# Patient Record
Sex: MALE | Race: BLACK OR AFRICAN AMERICAN | Employment: FULL TIME | URBAN - METROPOLITAN AREA
[De-identification: names, ages, dates, MRNs, and addresses within clinical notes are randomized per-mention and may not be internally consistent; named-entity substitution may affect disease eponyms.]

---

## 2020-09-21 ENCOUNTER — OFFICE VISIT (OUTPATIENT)
Dept: URGENT CARE | Age: 50
End: 2020-09-21
Payer: MEDICAID

## 2020-09-21 VITALS — OXYGEN SATURATION: 98 % | HEART RATE: 82 BPM | RESPIRATION RATE: 18 BRPM | TEMPERATURE: 98.5 F

## 2020-09-21 DIAGNOSIS — Z20.822 COVID-19 RULED OUT BY LABORATORY TESTING: Primary | ICD-10-CM

## 2020-09-21 PROCEDURE — 99212 OFFICE O/P EST SF 10 MIN: CPT | Performed by: FAMILY MEDICINE

## 2020-09-21 NOTE — PROGRESS NOTES
2020    Claudio Lee (:  1970) is a 54 y.o.  male, here requesting COVID-19 testing    History of Present Illness  Close contact with a lab confirmed COVID-19 patient within 14 days of symptom onset     Visit Vitals  Pulse 82   Temp 98.5 °F (36.9 °C)   Resp 18   SpO2 98%       ASSESSMENT  Screening for COVID-19/ Viral disease    PLAN  POCT influenza testing - Not Tested  COVID-19 sample collected and submitted. Patient given detailed CDC instructions contained within After Visit Summary. An  electronic signature was used to authenticate this note.     --Brie Conte MD

## 2020-09-23 LAB — SARS-COV-2, NAA: NOT DETECTED

## 2022-12-07 ENCOUNTER — HOSPITAL ENCOUNTER (EMERGENCY)
Age: 52
Discharge: HOME | End: 2022-12-07
Attending: STUDENT IN AN ORGANIZED HEALTH CARE EDUCATION/TRAINING PROGRAM
Payer: COMMERCIAL

## 2022-12-07 VITALS
OXYGEN SATURATION: 96 % | TEMPERATURE: 97.3 F | HEART RATE: 100 BPM | RESPIRATION RATE: 16 BRPM | SYSTOLIC BLOOD PRESSURE: 132 MMHG | DIASTOLIC BLOOD PRESSURE: 88 MMHG

## 2022-12-07 DIAGNOSIS — L02.416 ABSCESS OF LEFT THIGH: Primary | ICD-10-CM

## 2022-12-07 PROCEDURE — 99283 EMERGENCY DEPT VISIT LOW MDM: CPT

## 2022-12-07 RX ORDER — CEPHALEXIN 500 MG/1
500 CAPSULE ORAL 4 TIMES DAILY
Qty: 28 CAPSULE | Refills: 0 | Status: SHIPPED | OUTPATIENT
Start: 2022-12-07 | End: 2022-12-14

## 2022-12-07 RX ORDER — HYDROCODONE BITARTRATE AND ACETAMINOPHEN 5; 325 MG/1; MG/1
1 TABLET ORAL
Qty: 18 TABLET | Refills: 0 | Status: SHIPPED | OUTPATIENT
Start: 2022-12-07 | End: 2022-12-10

## 2022-12-07 RX ORDER — DOXYCYCLINE HYCLATE 100 MG
100 TABLET ORAL 2 TIMES DAILY
Qty: 14 TABLET | Refills: 0 | Status: SHIPPED | OUTPATIENT
Start: 2022-12-07 | End: 2022-12-14

## 2022-12-07 NOTE — ED TRIAGE NOTES
Pt c/o boil to the inside of his left upper thigh, +drainage, denies fever, stated it is about the size of a quarter

## 2022-12-07 NOTE — ED PROVIDER NOTES
Please note that this dictation was completed with [employer], the computer voice recognition software. Quite often unanticipated grammatical, syntax, homophones, and other interpretive errors are inadvertently transcribed by the computer software. Please disregard these errors. Please excuse any errors that have escaped final proofreading. Patient is a 51-year-old otherwise healthy male presenting to ED for evaluation of concern for abscess to his left thigh. He states he noticed this 3 to 4 days ago, has been applying warm compresses and has been able to express some pus and bloody discharge. He states the area is very painful especially when he walks. He denies fever or chills or any additional medical complaints at this time. Denies history of prior symptoms. Notes that he recently had a cortisone injection for his shoulder. No past medical history on file. No past surgical history on file. No family history on file. Social History     Socioeconomic History    Marital status:      Spouse name: Not on file    Number of children: Not on file    Years of education: Not on file    Highest education level: Not on file   Occupational History    Not on file   Tobacco Use    Smoking status: Never    Smokeless tobacco: Never   Substance and Sexual Activity    Alcohol use: Yes     Comment: socially    Drug use: Not on file    Sexual activity: Not on file   Other Topics Concern    Not on file   Social History Narrative    Not on file     Social Determinants of Health     Financial Resource Strain: Not on file   Food Insecurity: Not on file   Transportation Needs: Not on file   Physical Activity: Not on file   Stress: Not on file   Social Connections: Not on file   Intimate Partner Violence: Not on file   Housing Stability: Not on file         ALLERGIES: Patient has no known allergies. Review of Systems   Constitutional:  Negative for chills and fever.    HENT:  Negative for congestion, ear pain and sore throat. Eyes:  Negative for visual disturbance. Respiratory:  Negative for cough and shortness of breath. Cardiovascular:  Negative for chest pain. Gastrointestinal:  Negative for abdominal pain, diarrhea, nausea and vomiting. Genitourinary:  Negative for dysuria and flank pain. Musculoskeletal:  Negative for back pain. Skin:  Positive for color change and wound. Neurological:  Negative for dizziness and headaches. Psychiatric/Behavioral:  Negative for confusion. Vitals:    12/07/22 0848   BP: 132/88   Pulse: 100   Resp: 16   Temp: 97.3 °F (36.3 °C)   SpO2: 96%            Physical Exam  Vitals and nursing note reviewed. Constitutional:       General: He is not in acute distress. Appearance: Normal appearance. He is not ill-appearing. HENT:      Head: Normocephalic and atraumatic. Eyes:      General: Vision grossly intact. Extraocular Movements: Extraocular movements intact. Conjunctiva/sclera: Conjunctivae normal.   Neck:      Trachea: Phonation normal.   Cardiovascular:      Rate and Rhythm: Normal rate and regular rhythm. Heart sounds: Normal heart sounds. Pulmonary:      Effort: Pulmonary effort is normal.      Breath sounds: Normal breath sounds and air entry. Abdominal:      Palpations: Abdomen is soft. Tenderness: There is no abdominal tenderness. Musculoskeletal:         General: Normal range of motion. Left upper leg: Swelling (2-3 cm abscess to the left upper thigh, draining, with surrounding localized induration. Able to express purulent discharge with manual pressure.) and tenderness present. Legs:    Skin:     General: Skin is warm and dry. Neurological:      General: No focal deficit present. Mental Status: He is alert and oriented to person, place, and time.    Psychiatric:         Behavior: Behavior normal.        MDM  Number of Diagnoses or Management Options  Abscess of left thigh  Diagnosis management comments: Patient is alert, afebrile, vital stable. Presents ambulatory with abscess to his left thigh. Abscess is already actively draining, I was able to express further purulent discharge with direct pressure but he does not require further I&D here. Will recommend warm compresses, sitz bath's, and start him on antibiotics. Advised that he needs to continue to express the fluid and should follow-up with his PCP or general surgery if symptoms worsen or return. Return precautions outlined. All questions answered at this time. Amount and/or Complexity of Data Reviewed  Discuss the patient with other providers: yes (Discussed patient with ED attending Ambika Puckett MD who agrees with current management plan.     )         9:50 AM  Pt has been reevaluated. There are no new complaints, changes, or physical findings at this time. All results have been reviewed with patient and/or family. Medications have been reviewed w/ pt and/or family. Pt and/or family's questions have been answered. Pt and/or family expressed good understanding of the dx/tx/rx and is in agreement with plan of care. Pt instructed and agreed to f/u w/ surgery and to return to ED upon further deterioration. Return precautions outlined. All questions answered at this time. Pt is stable and ready for discharge. IMPRESSION:  1. Abscess of left thigh        PLAN:  1. Current Discharge Medication List        START taking these medications    Details   doxycycline (VIBRA-TABS) 100 mg tablet Take 1 Tablet by mouth two (2) times a day for 7 days. Qty: 14 Tablet, Refills: 0  Start date: 12/7/2022, End date: 12/14/2022      cephALEXin (Keflex) 500 mg capsule Take 1 Capsule by mouth four (4) times daily for 7 days. Qty: 28 Capsule, Refills: 0  Start date: 12/7/2022, End date: 12/14/2022      HYDROcodone-acetaminophen (Norco) 5-325 mg per tablet Take 1 Tablet by mouth every four (4) hours as needed for Pain for up to 3 days.  Max Daily Amount: 6 Tablets. Qty: 18 Tablet, Refills: 0  Start date: 12/7/2022, End date: 12/10/2022    Associated Diagnoses: Abscess of left thigh           2.    Follow-up Information       Follow up With Specialties Details Why Contact Info    Your Primary Care Provider  Schedule an appointment as soon as possible for a visit       Montana Quach MD General Surgery, Surgery General, Oncology Go to  As needed if abscess persists or returns [address] [id number] [phone]                Return to ED if worse     Procedures

## 2023-04-18 ENCOUNTER — HOSPITAL ENCOUNTER (EMERGENCY)
Age: 53
Discharge: HOME | End: 2023-04-18
Attending: EMERGENCY MEDICINE
Payer: COMMERCIAL

## 2023-04-18 ENCOUNTER — APPOINTMENT (OUTPATIENT)
Dept: CT IMAGING | Age: 53
End: 2023-04-18
Attending: EMERGENCY MEDICINE
Payer: COMMERCIAL

## 2023-04-18 VITALS
HEIGHT: 68 IN | OXYGEN SATURATION: 97 % | SYSTOLIC BLOOD PRESSURE: 130 MMHG | BODY MASS INDEX: 30.87 KG/M2 | HEART RATE: 62 BPM | WEIGHT: 203.71 LBS | TEMPERATURE: 98 F | RESPIRATION RATE: 13 BRPM | DIASTOLIC BLOOD PRESSURE: 90 MMHG

## 2023-04-18 DIAGNOSIS — R42 VERTIGO: Primary | ICD-10-CM

## 2023-04-18 DIAGNOSIS — R42 DIZZINESS: ICD-10-CM

## 2023-04-18 LAB
ALBUMIN SERPL-MCNC: 3.8 G/DL (ref 3.5–5)
ALBUMIN/GLOB SERPL: 1 (ref 1.1–2.2)
ALP SERPL-CCNC: 87 U/L (ref 45–117)
ALT SERPL-CCNC: 32 U/L (ref 12–78)
ANION GAP SERPL CALC-SCNC: 2 MMOL/L (ref 5–15)
AST SERPL-CCNC: 20 U/L (ref 15–37)
ATRIAL RATE: 68 BPM
BASOPHILS # BLD: 0 K/UL (ref 0–0.1)
BASOPHILS NFR BLD: 1 % (ref 0–1)
BILIRUB SERPL-MCNC: 0.7 MG/DL (ref 0.2–1)
BUN SERPL-MCNC: 15 MG/DL (ref 6–20)
BUN/CREAT SERPL: 13 (ref 12–20)
CALCIUM SERPL-MCNC: 8.7 MG/DL (ref 8.5–10.1)
CALCULATED P AXIS, ECG09: 64 DEGREES
CALCULATED R AXIS, ECG10: 67 DEGREES
CALCULATED T AXIS, ECG11: 39 DEGREES
CHLORIDE SERPL-SCNC: 107 MMOL/L (ref 97–108)
CO2 SERPL-SCNC: 29 MMOL/L (ref 21–32)
COMMENT, HOLDF: NORMAL
CREAT SERPL-MCNC: 1.17 MG/DL (ref 0.7–1.3)
DIAGNOSIS, 93000: NORMAL
DIFFERENTIAL METHOD BLD: ABNORMAL
EOSINOPHIL # BLD: 0.1 K/UL (ref 0–0.4)
EOSINOPHIL NFR BLD: 2 % (ref 0–7)
ERYTHROCYTE [DISTWIDTH] IN BLOOD BY AUTOMATED COUNT: 12.5 % (ref 11.5–14.5)
GLOBULIN SER CALC-MCNC: 4 G/DL (ref 2–4)
GLUCOSE BLD STRIP.AUTO-MCNC: 95 MG/DL (ref 65–117)
GLUCOSE SERPL-MCNC: 101 MG/DL (ref 65–100)
HCT VFR BLD AUTO: 44.5 % (ref 36.6–50.3)
HGB BLD-MCNC: 14.3 G/DL (ref 12.1–17)
IMM GRANULOCYTES # BLD AUTO: 0 K/UL (ref 0–0.04)
IMM GRANULOCYTES NFR BLD AUTO: 0 % (ref 0–0.5)
LYMPHOCYTES # BLD: 2.9 K/UL (ref 0.8–3.5)
LYMPHOCYTES NFR BLD: 64 % (ref 12–49)
MCH RBC QN AUTO: 27.9 PG (ref 26–34)
MCHC RBC AUTO-ENTMCNC: 32.1 G/DL (ref 30–36.5)
MCV RBC AUTO: 86.7 FL (ref 80–99)
MONOCYTES # BLD: 0.3 K/UL (ref 0–1)
MONOCYTES NFR BLD: 8 % (ref 5–13)
NEUTS SEG # BLD: 1.1 K/UL (ref 1.8–8)
NEUTS SEG NFR BLD: 25 % (ref 32–75)
NRBC # BLD: 0 K/UL (ref 0–0.01)
NRBC BLD-RTO: 0 PER 100 WBC
P-R INTERVAL, ECG05: 148 MS
PLATELET # BLD AUTO: 252 K/UL (ref 150–400)
PMV BLD AUTO: 9.3 FL (ref 8.9–12.9)
POTASSIUM SERPL-SCNC: 3.6 MMOL/L (ref 3.5–5.1)
PROT SERPL-MCNC: 7.8 G/DL (ref 6.4–8.2)
Q-T INTERVAL, ECG07: 420 MS
QRS DURATION, ECG06: 92 MS
QTC CALCULATION (BEZET), ECG08: 446 MS
RBC # BLD AUTO: 5.13 M/UL (ref 4.1–5.7)
SAMPLES BEING HELD,HOLD: NORMAL
SERVICE CMNT-IMP: NORMAL
SODIUM SERPL-SCNC: 138 MMOL/L (ref 136–145)
TROPONIN I SERPL HS-MCNC: 6 NG/L (ref 0–76)
VENTRICULAR RATE, ECG03: 68 BPM
WBC # BLD AUTO: 4.4 K/UL (ref 4.1–11.1)

## 2023-04-18 PROCEDURE — 74011250636 HC RX REV CODE- 250/636: Performed by: EMERGENCY MEDICINE

## 2023-04-18 PROCEDURE — 99284 EMERGENCY DEPT VISIT MOD MDM: CPT

## 2023-04-18 PROCEDURE — 93005 ELECTROCARDIOGRAM TRACING: CPT

## 2023-04-18 PROCEDURE — 85025 COMPLETE CBC W/AUTO DIFF WBC: CPT

## 2023-04-18 PROCEDURE — 36415 COLL VENOUS BLD VENIPUNCTURE: CPT

## 2023-04-18 PROCEDURE — 96374 THER/PROPH/DIAG INJ IV PUSH: CPT

## 2023-04-18 PROCEDURE — 80053 COMPREHEN METABOLIC PANEL: CPT

## 2023-04-18 PROCEDURE — 82962 GLUCOSE BLOOD TEST: CPT

## 2023-04-18 PROCEDURE — 70450 CT HEAD/BRAIN W/O DYE: CPT

## 2023-04-18 PROCEDURE — 84484 ASSAY OF TROPONIN QUANT: CPT

## 2023-04-18 PROCEDURE — 96361 HYDRATE IV INFUSION ADD-ON: CPT

## 2023-04-18 RX ORDER — ONDANSETRON 4 MG/1
4 TABLET, FILM COATED ORAL
Qty: 20 TABLET | Refills: 0 | Status: SHIPPED | OUTPATIENT
Start: 2023-04-18

## 2023-04-18 RX ORDER — MECLIZINE HYDROCHLORIDE 25 MG/1
25 TABLET ORAL
Qty: 20 TABLET | Refills: 0 | Status: SHIPPED | OUTPATIENT
Start: 2023-04-18

## 2023-04-18 RX ORDER — MECLIZINE HCL 12.5 MG 12.5 MG/1
25 TABLET ORAL
Status: COMPLETED | OUTPATIENT
Start: 2023-04-18 | End: 2023-04-18

## 2023-04-18 RX ORDER — DIAZEPAM 10 MG/2ML
2 INJECTION INTRAMUSCULAR
Status: COMPLETED | OUTPATIENT
Start: 2023-04-18 | End: 2023-04-18

## 2023-04-18 RX ADMIN — SODIUM CHLORIDE 1000 ML: 9 INJECTION, SOLUTION INTRAVENOUS at 05:40

## 2023-04-18 RX ADMIN — DIAZEPAM 2 MG: 5 INJECTION, SOLUTION INTRAMUSCULAR; INTRAVENOUS at 05:40

## 2023-04-18 RX ADMIN — MECLIZINE 25 MG: 12.5 TABLET ORAL at 05:40

## 2023-04-18 NOTE — ED NOTES
MD Rodolfo Solorzano reviewed discharge instructions with the patient. The patient verbalized understanding. All questions and concerns were addressed. The patient is discharged with instructions and prescriptions in hand. Pt is alert and oriented x 4. Respirations are clear and unlabored.

## 2023-04-18 NOTE — ED PROVIDER NOTES
[facility] EMERGENCY DEPT  EMERGENCY DEPARTMENT ENCOUNTER       Pt Name: Gayathri Faye  MRN: [id number]  [place] 1970  Date of evaluation: 4/18/2023  Provider: Ulices Pack MD   PCP: None  Note Started: 6:47 AM 4/18/23     CHIEF COMPLAINT       Chief Complaint   Patient presents with    Dizziness     Pt went to Gym this morning and on his first set, pt felt dizzy when he went from a lying to sitting position. Pt felt the room was spinning. Pt reports that at the gym he had an unsteady gait and reports feeling warm. HISTORY OF PRESENT ILLNESS: 1 or more elements      History From: Patient and Patient's Wife       Gayathri Faye is a 50 y.o. male with l history of cervical radiculopathy who presents to the ED with dizziness that started while he was working out at [employer] this morning. Patient reports he got up at about 3 AM at home to get ready and felt like his equilibrium was off when he was walking. He then drove to the gym and was working out doing bench presses. After his first set, he stood up off the bench and felt unsteady with walking. He then tells me he started \"to panic\". He laid down again and tried to get up and felt dizzy again. States the dizziness was room spinning and lightheaded feeling. He denies any chest pain, shortness of breath, palpitations, vision changes, numbness, tingling, weakness, nausea, vomiting, abdominal pain, dysuria, hematuria, urinary frequency, black or bloody stools. Wife reports that patient has been trying to lose weight recently and has cut the grass 3 times this week with a sauna suit on in order to lose weight. He also was wearing a sauna suit this morning while he was working out. REVIEW OF SYSTEMS      Review of Systems     Positives and Pertinent negatives as per HPI. PAST HISTORY     Past Medical History:  No past medical history on file. Past Surgical History:  No past surgical history on file.     Family History:  No family history on file. Social History:  Social History     Tobacco Use    Smoking status: Never    Smokeless tobacco: Never   Substance Use Topics    Alcohol use: Yes     Comment: socially       Allergies:  No Known Allergies    CURRENT MEDICATIONS      Previous Medications    No medications on file       PHYSICAL EXAM      ED Triage Vitals   ED Encounter Vitals Group      BP 04/18/23 0511 (!) 145/94      Pulse (Heart Rate) 04/18/23 0511 67      Resp Rate 04/18/23 0511 20      Temp 04/18/23 0511 98 °F (36.7 °C)      Temp src --       O2 Sat (%) 04/18/23 0511 98 %      Weight 04/18/23 0516 203 lb 11.3 oz      Height 04/18/23 0516 5' 8\"        Physical Exam  Vitals and nursing note reviewed. Constitutional:       General: He is not in acute distress. Appearance: Normal appearance. Eyes:      Extraocular Movements: Extraocular movements intact. Pupils: Pupils are equal, round, and reactive to light. Cardiovascular:      Rate and Rhythm: Normal rate and regular rhythm. Pulses: Normal pulses. Heart sounds: Normal heart sounds. No murmur heard. Pulmonary:      Effort: Pulmonary effort is normal.      Breath sounds: Normal breath sounds. No wheezing. Chest:      Chest wall: No tenderness. Abdominal:      Palpations: Abdomen is soft. Tenderness: There is no abdominal tenderness. Musculoskeletal:         General: No swelling or tenderness. Normal range of motion. Cervical back: Normal range of motion. No tenderness. Skin:     General: Skin is warm and dry. Capillary Refill: Capillary refill takes less than 2 seconds. Coloration: Skin is not jaundiced. Findings: No erythema. Neurological:      General: No focal deficit present. Mental Status: He is alert and oriented to person, place, and time. Motor: No weakness.       Comments: Negative test of skew, extraocular movements intact  Cranial nerves II through XII intact  No focal or motor deficit  Speech is normal Psychiatric:      Comments: Anxious          DIAGNOSTIC RESULTS   LABS:     Recent Results (from the past 12 hour(s))   EKG, 12 LEAD, INITIAL    Collection Time: 04/18/23  5:03 AM   Result Value Ref Range    Ventricular Rate 68 BPM    Atrial Rate 68 BPM    P-R Interval 148 ms    QRS Duration 92 ms    Q-T Interval 420 ms    QTC Calculation (Bezet) 446 ms    Calculated P Axis 64 degrees    Calculated R Axis 67 degrees    Calculated T Axis 39 degrees    Diagnosis       Normal sinus rhythm  Normal ECG  When compared with ECG of 31-OCT-2009 05:41,  No significant change was found     GLUCOSE, POC    Collection Time: 04/18/23  5:08 AM   Result Value Ref Range    Glucose (POC) 95 65 - 117 mg/dL    Performed by Bradford Rubio RN    CBC WITH AUTOMATED DIFF    Collection Time: 04/18/23  5:13 AM   Result Value Ref Range    WBC 4.4 4.1 - 11.1 K/uL    RBC 5.13 4.10 - 5.70 M/uL    HGB 14.3 12.1 - 17.0 g/dL    HCT 44.5 36.6 - 50.3 %    MCV 86.7 80.0 - 99.0 FL    MCH 27.9 26.0 - 34.0 PG    MCHC 32.1 30.0 - 36.5 g/dL    RDW 12.5 11.5 - 14.5 %    PLATELET 838 478 - 376 K/uL    MPV 9.3 8.9 - 12.9 FL    NRBC 0.0 0  WBC    ABSOLUTE NRBC 0.00 0.00 - 0.01 K/uL    NEUTROPHILS 25 (L) 32 - 75 %    LYMPHOCYTES 64 (H) 12 - 49 %    MONOCYTES 8 5 - 13 %    EOSINOPHILS 2 0 - 7 %    BASOPHILS 1 0 - 1 %    IMMATURE GRANULOCYTES 0 0.0 - 0.5 %    ABS. NEUTROPHILS 1.1 (L) 1.8 - 8.0 K/UL    ABS. LYMPHOCYTES 2.9 0.8 - 3.5 K/UL    ABS. MONOCYTES 0.3 0.0 - 1.0 K/UL    ABS. EOSINOPHILS 0.1 0.0 - 0.4 K/UL    ABS. BASOPHILS 0.0 0.0 - 0.1 K/UL    ABS. IMM.  GRANS. 0.0 0.00 - 0.04 K/UL    DF AUTOMATED     METABOLIC PANEL, COMPREHENSIVE    Collection Time: 04/18/23  5:13 AM   Result Value Ref Range    Sodium 138 136 - 145 mmol/L    Potassium 3.6 3.5 - 5.1 mmol/L    Chloride 107 97 - 108 mmol/L    CO2 29 21 - 32 mmol/L    Anion gap 2 (L) 5 - 15 mmol/L    Glucose 101 (H) 65 - 100 mg/dL    BUN 15 6 - 20 MG/DL    Creatinine 1.17 0.70 - 1.30 MG/DL BUN/Creatinine ratio 13 12 - 20      eGFR >60 >60 ml/min/1.73m2    Calcium 8.7 8.5 - 10.1 MG/DL    Bilirubin, total 0.7 0.2 - 1.0 MG/DL    ALT (SGPT) 32 12 - 78 U/L    AST (SGOT) 20 15 - 37 U/L    Alk. phosphatase 87 45 - 117 U/L    Protein, total 7.8 6.4 - 8.2 g/dL    Albumin 3.8 3.5 - 5.0 g/dL    Globulin 4.0 2.0 - 4.0 g/dL    A-G Ratio 1.0 (L) 1.1 - 2.2     TROPONIN-HIGH SENSITIVITY    Collection Time: 04/18/23  5:13 AM   Result Value Ref Range    Troponin-High Sensitivity 6 0 - 76 ng/L   SAMPLES BEING HELD    Collection Time: 04/18/23  5:13 AM   Result Value Ref Range    SAMPLES BEING HELD BLUE,SST     COMMENT        Add-on orders for these samples will be processed based on acceptable specimen integrity and analyte stability, which may vary by analyte. EKG at 5:03 AM on 4/18/2023 interpreted by me: Normal sinus rhythm, 68 bpm, normal axis, normal MD, QRS, QTc intervals, no ischemic changes     RADIOLOGY:       Interpretation per the Radiologist below, if available at the time of this note:     CT HEAD WO CONT    Result Date: 4/18/2023  EXAM: CT HEAD WO CONT INDICATION: dizziness COMPARISON: None. CONTRAST: None. TECHNIQUE: Unenhanced CT of the head was performed using 5 mm images. Brain and bone windows were generated. Coronal and sagittal reformats. CT dose reduction was achieved through use of a standardized protocol tailored for this examination and automatic exposure control for dose modulation. FINDINGS: The ventricles and sulci are normal in size, shape and configuration. There is no significant white matter disease. There is no intracranial hemorrhage, extra-axial collection, or mass effect. The basilar cisterns are open. No CT evidence of acute infarct. The bone windows demonstrate no abnormalities. There is near complete opacification of the left maxillary sinus. No acute intracranial process. Left maxillary sinus disease.                 EMERGENCY DEPARTMENT COURSE and DIFFERENTIAL DIAGNOSIS/MDM   Vitals:    Vitals:    04/18/23 0521 04/18/23 0523 04/18/23 0525 04/18/23 0555   BP: 127/86 (!) 128/92 (!) 128/92 (!) 130/90   Pulse: 73 77 67 62   Resp:    13   Temp:       SpO2: 96%  94% 97%   Weight:       Height:            Patient was given the following medications:  Medications   sodium chloride 0.9 % bolus infusion 1,000 mL (1,000 mL IntraVENous New Bag 4/18/23 0540)   meclizine (ANTIVERT) tablet 25 mg (25 mg Oral Given 4/18/23 0540)   diazePAM (VALIUM) injection 2 mg (2 mg IntraVENous Given 4/18/23 0540)               CC/HPI Summary, DDx, ED Course, and Reassessment:   Patient presents to the ED with dizziness while working out at the gym. Reports that he has been trying to lose weight and using a sauna suit while exercising and cutting the grass recently to help make him sweat more. On arrival, vital signs are reassuring. Patient does not have any focal neurologic deficits. Differential diagnosis includes benign positional vertigo, orthostatic hypotension, dehydration, electrolyte abnormality. Low suspicion for central process given negative test of skew. Labs and imaging obtained including CBC, CMP, troponin, head CT. We will treat with meclizine, Valium, IV fluids    Lab and imaging reviewed. Head CT normal.  CBC unremarkable. CMP and troponin are normal.      ED Course as of 04/18/23 0647   Tue Apr 18, 2023   6927 Patient feeling much better after meds and IV fluids in the ED. Ambulating in the ED without dizziness. Will discharge with prescription for meclizine. Instructed not to use honesty while exercising and to make sure he is drinking plenty of fluids [AO]      ED Course User Index  [AO] Ashlie Felix MD           FINAL IMPRESSION     1. Vertigo    2.  Dizziness          DISPOSITION/PLAN   Discharged         PATIENT REFERRED TO:  Follow-up Information       Follow up With Specialties Details Why Contact Info    [facility] EMERGENCY DEPT Emergency Medicine  If symptoms worsen [address] [place] [address]  [phone]              DISCHARGE MEDICATIONS:  Current Discharge Medication List        START taking these medications    Details   meclizine (ANTIVERT) 25 mg tablet Take 1 Tablet by mouth three (3) times daily as needed for Dizziness. Qty: 20 Tablet, Refills: 0  Start date: 4/18/2023      ondansetron hcl (Zofran) 4 mg tablet Take 1 Tablet by mouth every eight (8) hours as needed for Nausea. Qty: 20 Tablet, Refills: 0  Start date: 4/18/2023               DISCONTINUED MEDICATIONS:  Current Discharge Medication List          I am the Primary Clinician of Record. Natacha Alfred MD (electronically signed)    (Please note that parts of this dictation were completed with voice recognition software. Quite often unanticipated grammatical, syntax, homophones, and other interpretive errors are inadvertently transcribed by the computer software. Please disregards these errors.  Please excuse any errors that have escaped final proofreading.)